# Patient Record
Sex: FEMALE | URBAN - METROPOLITAN AREA
[De-identification: names, ages, dates, MRNs, and addresses within clinical notes are randomized per-mention and may not be internally consistent; named-entity substitution may affect disease eponyms.]

---

## 2019-08-14 NOTE — ASU PATIENT PROFILE, ADULT - AS [facility]ADEN NUTRITION
Observation/Bedded Outpatient   Physical Therapy Plan of Care Note    Primary Insurance: [facility]    Secondary Insurance: N/A    Note sent for required physician co-signature: Yes         Is the patient currently receiving skilled home care services (RN or therapy) No    Diagnosis:   1. Left hip pain        Therapy Diagnosis: Impaired gait    Assessment: Physical therapy orders received for evaluation and treatment. Patient is a 51 year old female from home with her  and son. Patient is status post left hip arthroscopy with labral repair and abductor repair. Patient is toe touch weight bearing with limitations into hip abduction, hip extension and adduction. Emphasis of session included education on precautions, bed mobility, transfers and gait with crutches and walker, stair mobility. Patient able to verbalize precautions back to therapist well. Used feedback boot during session to assist patient with toe touch weight bearing status, patient improved throughout session. Did feel patient was safer with walker at this time, patient agreeable and called family to attain walker for discharge. Patient had difficulty maintaining toe touch weight bearing with stair mobility. Patient practiced and agreeable to using a chair on step to enter and exit her home to maintain precautions. Patient's  is available 24/7. Patient had no further questions regarding mobility and possible discharge today. If patient remains in the hospital would continue to work on further gait training and maintenance of precautions.    Co morbidities:   Patient Active Problem List   Diagnosis   • Essential hypertension, benign   • Obesity   • Type II or unspecified type diabetes mellitus without mention of complication, not stated as uncontrolled   • Allergic rhinitis, cause unspecified   • Hyperlipidemia   • Fibrocystic breast disease   • Hayfever   • Allergic rhinitis, seasonal   • Hypertriglyceridemia    • Anemia   • Thrombocytosis (CMS/HCC)   • Iron (Fe) deficiency anemia   • Iron malabsorption       Clinical Presentation: Stable and/or uncomplicated characteristics    Precautions:  Precautions  Hip Precautions: Avoid adduction, Avoid abduction, Avoid extension, Use hip abductor brace, Perform passive abduction only  Weight Bearing Status: Toe touch weight bearing left                      Functional Status: (as of date/time noted)  Bed Mobility:  Supine to Sit: Modified Independent (12/28/17 1446)  Sit to Supine: Supervision (Supv) (12/28/17 1446)  Bed Mobility Comments: Patient able to exit bed to the right without crossing left leg past midline. Practiced entering bed with leg  and with pivoting on bottom. Patient says she plans on sleeping in her recliner at home. (12/28/17 1446)    Transfers:  Sit to Stand: Modified Independent (12/28/17 1446)  Stand to Sit: Modified Independent (12/28/17 1446)  Assistive Device/: 2-wheeled walker;Crutches;Gait Belt;1 Person (12/28/17 1446)  Transfer Comments 1: Completed multiple transfer with walker and crutches (4 with walker, three with crutches). Patient improved throughout session and was able to complete while maintaining weight bearing status. (12/28/17 1446)     Ambulation:  Gait  Gait Assistance: Supervision (Supv) (12/28/17 1446)  Assistive Device/: 2-wheeled walker;Crutches;Gait Belt;1 Person (12/28/17 1446)  Ambulation Distance (Feet):  (50) (12/28/17 1446)  Ambulation Surface: Tile (12/28/17 1446)  Gait Comments 1: Completed total of 50 feet ambulation with walker. Patient cued for putting left leg out in front of her and weight through arms. Placed feedback boot on which helped patient improve with maintaining weight bearing status. No loss of balance. Cued to take her time. Minimal beeping of boot by end of session. (12/28/17 1446)  Second Trial: Yes (12/28/17 1446)  Gait - Second Trial  Gait Assistance: Touching/Steadying  Assistance (12/28/17 1446)  Assistive Device/: Crutches (12/28/17 1446)  Ambulation Distance (Feet): 30 Feet (12/28/17 1446)  Ambulation Surface: Tile (12/28/17 1446)  Gait Comments 1: Patient tended to overeach with crutches which caused more difficulty with weight bearing status. Did improve throughout session with minimal beeping occuring with feedback shoe. (12/28/17 1446)         Stairs:  Stairs Mobility  Number of Stairs: 2 (12/28/17 1446)  Stair Management Assistance: Minimal Assist (Min) (12/28/17 1446)  Stair Management Technique: With gait belt;With walker;Backwards (with step) (12/28/17 1446)  Stairs Mobility Comments: Attempted step with crutches but patient unable to off load left leg to advance right leg forward so did not complete. Had patient try walker backwards approach to step which patient was also unable to complete with toe touch weight bearing status. Had patient sit on chair on step backwards, which patient was able to complete. She was able to descend the stairs with the walker and toe touch weight bearing status. (12/28/17 1446)    See PT (physical therapy) Flowsheet for full details regarding the PT provided.    Education: On this date, the patient was educated on toe touch weight bearing status, hip precautions, safety with stair mobility.   The response to education was: Verbalizes understanding, Demonstrates understanding and Needs reinforcement    Barriers to Discharge: toe touch weight bearing    Long Term Treatment Goals:  Bed Mobility Discharge Goal:    Transfer Discharge Goal:    Ambulation Discharge Goal: Patient will ambulate 50 feet modified independent for safe ambulation within her home.  Stairs Discharge Goal:    Therapeutic Exercise Discharge Goal:    Other Discharge Goal 1:    Other Discharge Goal 2:      Treatment Interventions: Gait training, Safety Education, Neuromuscular re-education  Amount: 60-90  Frequency:  1-2 sessions  Duration: 2 days    Plan for  Next Session: review precautions and safety with gait - use feedback boot    Recommendations for Discharge: Home, 24 Hour assist    Billing Information:    Timed Procedures:   ,  , $ Gait Trainin-52 mins,  ,  ,  ,  ,  , $ Therapy Activities per 15 min: 8-22 mins,  ,  ,  ,      Untimed Procedures:   ,  ,  ,  ,  ,  , $ PT Eval: Low Complexity: 1 Procedure,   ,      G-Codes:   ,  ,  ,  ,  ,  ,  ,  ,  ,  ,  ,  ,  ,  ,  ,  ,  ,      Total Treatment Time:   PT Time Spent: 87 minutes    Timed Treatment Minutes:  OBS Patients Only:  PT Timed Code TX Minutes: 60 minutes    The co-signature indicates that the physician certifies the need for PT furnished under this plan of treatment while under his/her care.   (4) excellent

## 2019-08-15 ENCOUNTER — OUTPATIENT (OUTPATIENT)
Dept: OUTPATIENT SERVICES | Facility: HOSPITAL | Age: 34
LOS: 1 days | Discharge: HOME | End: 2019-08-15
Payer: COMMERCIAL

## 2019-08-15 VITALS
HEIGHT: 60 IN | HEART RATE: 75 BPM | DIASTOLIC BLOOD PRESSURE: 48 MMHG | TEMPERATURE: 97 F | SYSTOLIC BLOOD PRESSURE: 106 MMHG | WEIGHT: 110.01 LBS | RESPIRATION RATE: 16 BRPM

## 2019-08-15 VITALS
RESPIRATION RATE: 14 BRPM | HEART RATE: 60 BPM | OXYGEN SATURATION: 100 % | SYSTOLIC BLOOD PRESSURE: 94 MMHG | DIASTOLIC BLOOD PRESSURE: 60 MMHG

## 2019-08-15 DIAGNOSIS — R22.2 LOCALIZED SWELLING, MASS AND LUMP, TRUNK: ICD-10-CM

## 2019-08-15 PROCEDURE — 88304 TISSUE EXAM BY PATHOLOGIST: CPT

## 2019-08-15 PROCEDURE — 21932 EXC BACK TUM DEEP < 5 CM: CPT

## 2019-08-15 RX ORDER — SODIUM CHLORIDE 9 MG/ML
1000 INJECTION, SOLUTION INTRAVENOUS
Refills: 0 | Status: DISCONTINUED | OUTPATIENT
Start: 2019-08-15 | End: 2019-08-15

## 2019-08-15 RX ORDER — HYDROMORPHONE HYDROCHLORIDE 2 MG/ML
0.5 INJECTION INTRAMUSCULAR; INTRAVENOUS; SUBCUTANEOUS ONCE
Refills: 0 | Status: DISCONTINUED | OUTPATIENT
Start: 2019-08-15 | End: 2019-08-15

## 2019-08-15 RX ORDER — OXYCODONE AND ACETAMINOPHEN 5; 325 MG/1; MG/1
1 TABLET ORAL EVERY 4 HOURS
Refills: 0 | Status: DISCONTINUED | OUTPATIENT
Start: 2019-08-15 | End: 2019-08-15

## 2019-08-15 NOTE — PACU DISCHARGE NOTE - COMMENTS
Post operative period without c/o pain or N/V. All discharge instruction reviewed  and pt verbalized complete understanding. pt escorted to pharmacy then to lobby to meet her family members.

## 2019-08-15 NOTE — BRIEF OPERATIVE NOTE - OPERATION/FINDINGS
Procedure:  Indication  IVF:  EBL:  UOP: No hebert Procedure: excision of back mass, subcutaneous  Indication: subcutaneous back mass, likely lipoma  - patient was positioned prone with local and sedation  - area was prepped and raped, local was injected - mass was excised in total down to fascial layer  - wound was reapproximated with 3-0 vicryl and 4-0 monocryl  - hemostasis was achieved and patient tolerated the procedure well.   IVF:  EBL:  UOP: No hebert

## 2019-08-19 LAB — SURGICAL PATHOLOGY STUDY: SIGNIFICANT CHANGE UP
